# Patient Record
Sex: MALE | Race: WHITE | Employment: OTHER | URBAN - METROPOLITAN AREA
[De-identification: names, ages, dates, MRNs, and addresses within clinical notes are randomized per-mention and may not be internally consistent; named-entity substitution may affect disease eponyms.]

---

## 2023-01-11 ENCOUNTER — HOSPITAL ENCOUNTER (EMERGENCY)
Age: 78
Discharge: HOME | End: 2023-01-11
Payer: MEDICARE

## 2023-01-11 VITALS
SYSTOLIC BLOOD PRESSURE: 148 MMHG | HEART RATE: 89 BPM | TEMPERATURE: 98.6 F | BODY MASS INDEX: 32.82 KG/M2 | WEIGHT: 222.22 LBS | OXYGEN SATURATION: 100 % | RESPIRATION RATE: 17 BRPM | DIASTOLIC BLOOD PRESSURE: 84 MMHG

## 2023-01-11 DIAGNOSIS — R04.0 EPISTAXIS: Primary | ICD-10-CM

## 2023-01-11 PROCEDURE — 99283 EMERGENCY DEPT VISIT LOW MDM: CPT

## 2023-01-11 PROCEDURE — 6370000000 HC RX 637 (ALT 250 FOR IP): Performed by: PHYSICIAN ASSISTANT

## 2023-01-11 PROCEDURE — 30901 CONTROL OF NOSEBLEED: CPT

## 2023-01-11 RX ORDER — OXYMETAZOLINE HYDROCHLORIDE 0.05 G/100ML
2 SPRAY NASAL ONCE
Status: COMPLETED | OUTPATIENT
Start: 2023-01-11 | End: 2023-01-11

## 2023-01-11 RX ORDER — CEPHALEXIN 500 MG/1
500 CAPSULE ORAL 2 TIMES DAILY
Qty: 14 CAPSULE | Refills: 0 | Status: SHIPPED | OUTPATIENT
Start: 2023-01-11 | End: 2023-01-18

## 2023-01-11 RX ADMIN — SILVER NITRATE APPLICATORS 1 EACH: 25; 75 STICK TOPICAL at 19:50

## 2023-01-11 RX ADMIN — OXYMETAZOLINE HCL 2 SPRAY: 0.05 SPRAY NASAL at 19:51

## 2023-01-11 ASSESSMENT — ENCOUNTER SYMPTOMS
SHORTNESS OF BREATH: 0
VOMITING: 0
COLOR CHANGE: 0
CHOKING: 0

## 2023-01-12 NOTE — ED PROVIDER NOTES
[address] [facility]        Pt Name: Dean Draper  MRN: [id number]  [place] 1945  Date of evaluation: 1/11/2023  Provider: LIN Brock  PCP: Simran Rose MD  Note Started: 8:01 PM EST 1/11/23      MACEY. I have evaluated this patient. My supervising physician was available for consultation. CHIEF COMPLAINT       Chief Complaint   Patient presents with    Epistaxis     X30 min; takes eliquis       HISTORY OF PRESENT ILLNESS: 1 or more Elements     History from : Patient    Limitations to history : None    Dean Draper is a 65 y.o. male who presents with a right-sided nosebleed. About 30 minutes ago he blew his nose and started bleeding profusely from his nares. He does take Eliquis with a history of A. fib. He has had a total of 3 nosebleeds in his life but denies prior history of requiring treatment at the emergency department for nosebleeds or cauterization. He denies head or facial injury. He does have a history of hypertension on lisinopril. No shortness of breath. Nursing Notes were all reviewed and agreed with or any disagreements were addressed in the HPI. REVIEW OF SYSTEMS :      Review of Systems   Constitutional:  Negative for fever. HENT:  Positive for nosebleeds. Respiratory:  Negative for choking and shortness of breath. Gastrointestinal:  Negative for vomiting. Skin:  Negative for color change and wound. Psychiatric/Behavioral:  Negative for agitation, behavioral problems and confusion. Positives and Pertinent negatives as per HPI. SURGICAL HISTORY   History reviewed. No pertinent surgical history. [address]       Discharge Medication List as of 1/11/2023  8:04 PM          ALLERGIES     Patient has no known allergies. FAMILYHISTORY     History reviewed. No pertinent family history.      SOCIAL HISTORY       Social History     Tobacco Use    Smoking status: Never Smokeless tobacco: Never   Substance Use Topics    Alcohol use: No    Drug use: No       SCREENINGS        Alejandra Coma Scale  Eye Opening: Spontaneous  Best Verbal Response: Oriented  Best Motor Response: Obeys commands  Alejandra Coma Scale Score: 15                CIWA Assessment  BP: (!) 148/84  Heart Rate: 89           PHYSICAL EXAM  1 or more Elements     ED Triage Vitals [01/11/23 1915]   BP Temp Temp Source Heart Rate Resp SpO2 Height Weight   (!) 153/86 -- Oral (!) 102 18 99 % -- 222 lb 3.6 oz (100.8 kg)       Physical Exam  Vitals and nursing note reviewed. Constitutional:       General: He is not in acute distress. HENT:      Nose:      Right Nostril: Epistaxis present. No septal hematoma. Mouth/Throat:      Mouth: Mucous membranes are moist.      Pharynx: Oropharynx is clear. Eyes:      Pupils: Pupils are equal, round, and reactive to light. Cardiovascular:      Rate and Rhythm: Tachycardia present. Pulmonary:      Effort: Pulmonary effort is normal. No respiratory distress. Musculoskeletal:         General: Normal range of motion. Cervical back: Normal range of motion. Neurological:      General: No focal deficit present. Mental Status: He is alert and oriented to person, place, and time. Psychiatric:         Mood and Affect: Mood normal.         Behavior: Behavior normal.       DIAGNOSTIC RESULTS   LABS:    Labs Reviewed - No data to display    When ordered only abnormal lab results are displayed. All other labs were within normal range or not returned as of this dictation. EKG: When ordered, EKG's are interpreted by the Emergency Department Physician in the absence of a cardiologist.  Please see their note for interpretation of EKG.     RADIOLOGY:   Non-plain film images such as CT, Ultrasound and MRI are read by the radiologist. Plain radiographic images are visualized and preliminarily interpreted by the ED Provider with the below findings:    Interpretation per the Radiologist below, if available at the time of this note:    No orders to display     No results found.    No results found.    PROCEDURES   Unless otherwise noted below, none     Epistaxis Mgmt    Date/Time: 1/11/2023 9:44 PM  Performed by: LIN Lundy  Authorized by: LIN Lundy     Consent:     Consent obtained:  Verbal    Consent given by:  Patient    Risks, benefits, and alternatives were discussed: yes      Risks discussed:  Infection, pain and bleeding  Universal protocol:     Patient identity confirmed:  Verbally with patient  Procedure details:     Treatment site:  R anterior    Treatment method:  Anterior pack and silver nitrate    Treatment complexity:  Extensive    Treatment episode: initial    Post-procedure details:     Assessment:  Bleeding stopped    Procedure completion:  Tolerated    CRITICAL CARE TIME (.cctime)   I performed a total Critical Care time of 0 minutes, excluding separately reportable procedures.  There was a high probability of clinically significant/life threatening deterioration in the patient's condition which required my urgent intervention. Not limited to multiple reexaminations, discussions with attending physician and consultants.    PAST MEDICAL HISTORY      has a past medical history of A-fib ([facility]) (05/2018) and Diabetes mellitus ([facility]).     EMERGENCY DEPARTMENT COURSE and DIFFERENTIAL DIAGNOSIS/MDM:   Vitals:    Vitals:    01/11/23 1915 01/11/23 2030   BP: (!) 153/86 (!) 148/84   Pulse: (!) 102 89   Resp: 18 17   Temp:  98.6 °F (37 °C)   TempSrc: Oral Oral   SpO2: 99% 100%   Weight: 222 lb 3.6 oz (100.8 kg)        Patient was given the following medications:  Medications   silver nitrate applicators applicator 1 each (1 each Topical Given 1/11/23 1950)   oxymetazoline (AFRIN) 0.05 % nasal spray 2 spray (2 sprays Each Nostril Given by Other 1/11/23 1951)             Is this patient to be included in the SEP-1 Core Measure due to severe sepsis or septic shock?   No  Exclusion criteria - the patient is NOT to be included for SEP-1 Core Measure due to: Infection is not suspected    CONSULTS: (Who and What was discussed)  None  Discussion with Other Profesionals : None    Social Determinants : None    Records Reviewed : None    CC/HPI Summary, DDx, ED Course, and Reassessment: Patient afebrile initially tachycardic on recheck pulses normal.  Blood pressure around 378V systolic not hypoxic. He has brisk anterior nosebleed from the right nares. He is on Eliquis. Despite Afrin, compression, several attempts at cauterization was unable to get the brisk bleeding to stop so I placed a Rhino Rocket started the patient on Keflex and referred him to ENT. He is advised to have the rocket removed in the next couple of days return for new or worsening. He ambulated to the bathroom and around the room without rebleeding. DDX:Coagulopathy, Platelet Dysfunction, Thrombocytopenia, Anemia, Hpertension, Nasal Infection, Aspiration Pneumonia. I am the Primary Clinician of Record.     FINAL IMPRESSION      1. Epistaxis          DISPOSITION/PLAN     DISPOSITION Decision To Discharge 01/11/2023 07:58:17 PM      PATIENT REFERRED TO:  [address]  [address] [place] [address]  [phone]    Call in 1 day  For follow up with ENT      DISCHARGE MEDICATIONS:  Discharge Medication List as of 1/11/2023  8:04 PM        START taking these medications    Details   cephALEXin (KEFLEX) 500 MG capsule Take 1 capsule by mouth 2 times daily for 7 days, Disp-14 capsule, R-0Print             DISCONTINUED MEDICATIONS:  Discharge Medication List as of 1/11/2023  8:04 PM                 (Please note that portions of this note were completed with a voice recognition program.  Efforts were made to edit the dictations but occasionally words are mis-transcribed.)    Karmen Olmos (electronically signed)            LIN Olmos  01/11/23 4704

## 2023-01-16 ENCOUNTER — OFFICE VISIT (OUTPATIENT)
Dept: ENT CLINIC | Age: 78
End: 2023-01-16
Payer: MEDICARE

## 2023-01-16 VITALS
WEIGHT: 220 LBS | HEART RATE: 101 BPM | RESPIRATION RATE: 16 BRPM | SYSTOLIC BLOOD PRESSURE: 131 MMHG | OXYGEN SATURATION: 96 % | DIASTOLIC BLOOD PRESSURE: 85 MMHG | HEIGHT: 69 IN | BODY MASS INDEX: 32.58 KG/M2

## 2023-01-16 DIAGNOSIS — R04.0 EPISTAXIS: Primary | ICD-10-CM

## 2023-01-16 DIAGNOSIS — Z92.29 HX OF LONG TERM USE OF BLOOD THINNERS: ICD-10-CM

## 2023-01-16 PROCEDURE — 1123F ACP DISCUSS/DSCN MKR DOCD: CPT | Performed by: OTOLARYNGOLOGY

## 2023-01-16 PROCEDURE — G8427 DOCREV CUR MEDS BY ELIG CLIN: HCPCS | Performed by: OTOLARYNGOLOGY

## 2023-01-16 PROCEDURE — 99203 OFFICE O/P NEW LOW 30 MIN: CPT | Performed by: OTOLARYNGOLOGY

## 2023-01-16 PROCEDURE — 31238 NSL/SINS NDSC SRG NSL HEMRRG: CPT | Performed by: OTOLARYNGOLOGY

## 2023-01-16 PROCEDURE — G8484 FLU IMMUNIZE NO ADMIN: HCPCS | Performed by: OTOLARYNGOLOGY

## 2023-01-16 PROCEDURE — 1036F TOBACCO NON-USER: CPT | Performed by: OTOLARYNGOLOGY

## 2023-01-16 PROCEDURE — G8417 CALC BMI ABV UP PARAM F/U: HCPCS | Performed by: OTOLARYNGOLOGY

## 2023-01-16 RX ORDER — DOXYCYCLINE HYCLATE 100 MG/1
CAPSULE ORAL
COMMUNITY

## 2023-01-16 RX ORDER — LISINOPRIL AND HYDROCHLOROTHIAZIDE 25; 20 MG/1; MG/1
TABLET ORAL
COMMUNITY
Start: 2022-08-25

## 2023-01-16 RX ORDER — ERYTHROMYCIN 5 MG/G
OINTMENT OPHTHALMIC
COMMUNITY

## 2023-01-16 RX ORDER — ATORVASTATIN CALCIUM 40 MG/1
TABLET, FILM COATED ORAL
COMMUNITY
Start: 2022-08-29

## 2023-01-16 RX ORDER — BIOTIN 10 MG
1 TABLET ORAL DAILY
COMMUNITY

## 2023-01-16 RX ORDER — METOPROLOL SUCCINATE 50 MG/1
TABLET, EXTENDED RELEASE ORAL
COMMUNITY
Start: 2022-07-08

## 2023-01-16 NOTE — PROGRESS NOTES
[facility]      Patient Name: [address] [place] [address] Record Number:  [id number]  Primary Care Physician:  Loki Chávez MD  Date of Consultation: 1/16/2023    Chief Complaint: Epistaxis        HISTORY OF PRESENT ILLNESS  Chadd Torrez is a(n) 65 y.o. male who presents for evaluation of nosebleed. The patient said he had a very bad nosebleed on 11 January. He ended up having a Rhino Rocket placed on the right side. He says he does not really have any significant history of bad nosebleeds. He is on Eliquis for A. fib, but did get permission from his cardiologist to hold it. He has not taken it since the nosebleed. He denies any nasal trauma. Denies nasal surgery. Has been on an antibiotic. REVIEW OF SYSTEMS  As above    PHYSICAL EXAM  GENERAL: No Acute Distress, Alert and Oriented, no Hoarseness, strong voice  EYES: EOMI, Anti-icteric  HENT:   Head: Normocephalic and atraumatic. Face:  Symmetric, facial nerve intact, no sinus tenderness  Right Ear: Normal external ear  Left Ear: Normal external ear,  Mouth/Oral Cavity:  normal lips, Uvula is midline, no mucosal lesions, no trismus,   Oropharynx/Larynx:  normal oropharynx  Nose:Normal external nasal appearance. See below  NECK: Normal range of motion, no thyromegaly, trachea is midline, no lymphadenopathy, no neck masses, no crepitus      PROCEDURE  Nasal endoscopy with control of epistaxis  The right-sided Rhino Rocket was removed. Afrin and lidocaine were applied the bilateral nasal cavity. A rigid scope was used to visualize the left nasal cavity. It appeared to be normal.  The rigid scope was then used to visualize the right nasal cavity. There was some blood that I removed with a Salcedo suction. He had some oozing from the right inferior turbinate as well as the left septum. It was probably bleeding most significantly on the inferior septum.   Using the endoscope and silver nitrate I cauterized these areas. I then covered it with Surgicel. He tolerated this well        ASSESSMENT/PLAN  1. Epistaxis  The Rhino Rocket was removed today. It usually difficult to tell the exact location of the original bleeding, but I suspect it was the septum about 1 to 2 cm from anterior nasal cavity. I cauterize several areas in the right nasal cavity today. I would like for him to use nasal saline several times a day. I told him to take it relatively easy over the next week. I think as long as he has no bleeding he can restart his Eliquis tomorrow. I would like to see him in 3 weeks. 2. Hx of long term use of blood thinners  As above             I have performed a head and neck physical exam personally or was physically present during the key or critical portions of the service. This note was generated completely or in part utilizing Dragon dictation speech recognition software. Occasionally, words are mistranscribed and despite editing, the text may contain inaccuracies due to incorrect word recognition. If further clarification is needed please contact the office at [phone].

## 2023-02-02 ENCOUNTER — OFFICE VISIT (OUTPATIENT)
Dept: ENT CLINIC | Age: 78
End: 2023-02-02
Payer: MEDICARE

## 2023-02-02 VITALS
WEIGHT: 217 LBS | HEIGHT: 69 IN | OXYGEN SATURATION: 97 % | SYSTOLIC BLOOD PRESSURE: 144 MMHG | BODY MASS INDEX: 32.14 KG/M2 | HEART RATE: 87 BPM | DIASTOLIC BLOOD PRESSURE: 85 MMHG

## 2023-02-02 DIAGNOSIS — Z92.29 HX OF LONG TERM USE OF BLOOD THINNERS: ICD-10-CM

## 2023-02-02 DIAGNOSIS — R04.0 EPISTAXIS: Primary | ICD-10-CM

## 2023-02-02 PROCEDURE — G8484 FLU IMMUNIZE NO ADMIN: HCPCS | Performed by: OTOLARYNGOLOGY

## 2023-02-02 PROCEDURE — 1036F TOBACCO NON-USER: CPT | Performed by: OTOLARYNGOLOGY

## 2023-02-02 PROCEDURE — 1123F ACP DISCUSS/DSCN MKR DOCD: CPT | Performed by: OTOLARYNGOLOGY

## 2023-02-02 PROCEDURE — G8417 CALC BMI ABV UP PARAM F/U: HCPCS | Performed by: OTOLARYNGOLOGY

## 2023-02-02 PROCEDURE — 99213 OFFICE O/P EST LOW 20 MIN: CPT | Performed by: OTOLARYNGOLOGY

## 2023-02-02 PROCEDURE — G8427 DOCREV CUR MEDS BY ELIG CLIN: HCPCS | Performed by: OTOLARYNGOLOGY

## 2023-02-02 NOTE — PROGRESS NOTES
[address] [facility]  Follow up      Patient Name: [address] [place] [address] Record Number:  [id number]  Primary Care Physician:  Jam Loya MD  Date of Consultation: 2/2/2023    Chief Complaint: Nosebleed        Interval History  Patient is following up for his nosebleed. On January 16, 2023 I remove the [employer] and cauterized some areas in the right nasal cavity. He has been using nasal saline. He has not had any nosebleeds since that time. REVIEW OF SYSTEMS  As above    PHYSICAL EXAM  GENERAL: No Acute Distress, Alert and Oriented, no Hoarseness, strong voice  EYES: EOMI, Anti-icteric  HENT:   Head: Normocephalic and atraumatic. Face:  Symmetric, facial nerve intact, no sinus tenderness  Right Ear: Normal external ear  Left Ear: Normal external ear  Mouth/Oral Cavity:  normal lips, Uvula is midline, no mucosal lesions  Oropharynx/Larynx:  normal oropharynx,  Nose:Normal external nasal appearance. Anterior rhinoscopy shows some crusting in the nasal cavity on the right side without evidence of recent bleeding  NECK: Normal range of motion, no thyromegaly, trachea is midline, no lymphadenopathy, no neck masses, no crepitus          ASSESSMENT/PLAN  1.  Epistaxis- he has not had any bleeding since I saw him. Continue the nasal saline at least until the weather warms up. I told him that if he has any significant bleeding to call the office and I will get him back in.    2. Hx of long term use of blood thinners  Continue blood thinners as prescribed. I have performed a head and neck physical exam personally or was physically present during the key or critical portions of the service. This note was generated completely or in part utilizing Dragon dictation speech recognition software. Occasionally, words are mistranscribed and despite editing, the text may contain inaccuracies due to incorrect word recognition.   If further clarification is needed please contact the office at [phone].

## 2023-03-04 ENCOUNTER — HOSPITAL ENCOUNTER (EMERGENCY)
Age: 78
Discharge: HOME | End: 2023-03-04
Attending: EMERGENCY MEDICINE
Payer: MEDICARE

## 2023-03-04 VITALS
DIASTOLIC BLOOD PRESSURE: 91 MMHG | HEIGHT: 69 IN | OXYGEN SATURATION: 98 % | TEMPERATURE: 97.7 F | RESPIRATION RATE: 18 BRPM | WEIGHT: 216.27 LBS | SYSTOLIC BLOOD PRESSURE: 173 MMHG | BODY MASS INDEX: 32.03 KG/M2 | HEART RATE: 99 BPM

## 2023-03-04 DIAGNOSIS — R04.0 EPISTAXIS: Primary | ICD-10-CM

## 2023-03-04 LAB
A/G RATIO: 1.4 (ref 1.1–2.2)
ABO/RH: NORMAL
ALBUMIN SERPL-MCNC: 4.4 G/DL (ref 3.4–5)
ALP BLD-CCNC: 84 U/L (ref 40–129)
ALT SERPL-CCNC: 12 U/L (ref 10–40)
ANION GAP SERPL CALCULATED.3IONS-SCNC: 15 MMOL/L (ref 3–16)
ANTIBODY SCREEN: NORMAL
APTT: 30.3 SEC (ref 23–34.3)
AST SERPL-CCNC: 14 U/L (ref 15–37)
BASOPHILS ABSOLUTE: 0 K/UL (ref 0–0.2)
BASOPHILS RELATIVE PERCENT: 1.5 %
BILIRUB SERPL-MCNC: 0.7 MG/DL (ref 0–1)
BUN BLDV-MCNC: 16 MG/DL (ref 7–20)
CALCIUM SERPL-MCNC: 9.2 MG/DL (ref 8.3–10.6)
CHLORIDE BLD-SCNC: 102 MMOL/L (ref 99–110)
CO2: 20 MMOL/L (ref 21–32)
CREAT SERPL-MCNC: 0.7 MG/DL (ref 0.8–1.3)
EOSINOPHILS ABSOLUTE: 0.1 K/UL (ref 0–0.6)
EOSINOPHILS RELATIVE PERCENT: 2.4 %
GFR SERPL CREATININE-BSD FRML MDRD: >60 ML/MIN/{1.73_M2}
GLUCOSE BLD-MCNC: 193 MG/DL (ref 70–99)
HCT VFR BLD CALC: 38.8 % (ref 40.5–52.5)
HEMOGLOBIN: 13 G/DL (ref 13.5–17.5)
INR BLD: 1.23 (ref 0.87–1.14)
LYMPHOCYTES ABSOLUTE: 1.4 K/UL (ref 1–5.1)
LYMPHOCYTES RELATIVE PERCENT: 41.5 %
MCH RBC QN AUTO: 32.8 PG (ref 26–34)
MCHC RBC AUTO-ENTMCNC: 33.5 G/DL (ref 31–36)
MCV RBC AUTO: 97.9 FL (ref 80–100)
MONOCYTES ABSOLUTE: 0.6 K/UL (ref 0–1.3)
MONOCYTES RELATIVE PERCENT: 17 %
NEUTROPHILS ABSOLUTE: 1.2 K/UL (ref 1.7–7.7)
NEUTROPHILS RELATIVE PERCENT: 37.6 %
PDW BLD-RTO: 16.1 % (ref 12.4–15.4)
PLATELET # BLD: 91 K/UL (ref 135–450)
PLATELET SLIDE REVIEW: ABNORMAL
PMV BLD AUTO: 8.6 FL (ref 5–10.5)
POTASSIUM REFLEX MAGNESIUM: 3.8 MMOL/L (ref 3.5–5.1)
PROTHROMBIN TIME: 15.4 SEC (ref 11.7–14.5)
RBC # BLD: 3.97 M/UL (ref 4.2–5.9)
SLIDE REVIEW: ABNORMAL
SODIUM BLD-SCNC: 137 MMOL/L (ref 136–145)
TOTAL PROTEIN: 7.6 G/DL (ref 6.4–8.2)
WBC # BLD: 3.3 K/UL (ref 4–11)

## 2023-03-04 PROCEDURE — 85025 COMPLETE CBC W/AUTO DIFF WBC: CPT

## 2023-03-04 PROCEDURE — 86850 RBC ANTIBODY SCREEN: CPT

## 2023-03-04 PROCEDURE — 85730 THROMBOPLASTIN TIME PARTIAL: CPT

## 2023-03-04 PROCEDURE — 99283 EMERGENCY DEPT VISIT LOW MDM: CPT

## 2023-03-04 PROCEDURE — 85610 PROTHROMBIN TIME: CPT

## 2023-03-04 PROCEDURE — 80053 COMPREHEN METABOLIC PANEL: CPT

## 2023-03-04 PROCEDURE — 86900 BLOOD TYPING SEROLOGIC ABO: CPT

## 2023-03-04 PROCEDURE — 30903 CONTROL OF NOSEBLEED: CPT

## 2023-03-04 PROCEDURE — 86901 BLOOD TYPING SEROLOGIC RH(D): CPT

## 2023-03-04 PROCEDURE — 6370000000 HC RX 637 (ALT 250 FOR IP): Performed by: EMERGENCY MEDICINE

## 2023-03-04 RX ORDER — OXYMETAZOLINE HYDROCHLORIDE 0.05 G/100ML
2 SPRAY NASAL ONCE
Status: COMPLETED | OUTPATIENT
Start: 2023-03-04 | End: 2023-03-04

## 2023-03-04 RX ORDER — AMOXICILLIN 500 MG/1
500 CAPSULE ORAL 3 TIMES DAILY
Qty: 30 CAPSULE | Refills: 0 | Status: SHIPPED | OUTPATIENT
Start: 2023-03-04 | End: 2023-03-14

## 2023-03-04 RX ORDER — AMOXICILLIN 250 MG/1
500 CAPSULE ORAL ONCE
Status: COMPLETED | OUTPATIENT
Start: 2023-03-04 | End: 2023-03-04

## 2023-03-04 RX ORDER — LIDOCAINE HYDROCHLORIDE 20 MG/ML
JELLY TOPICAL PRN
Status: DISCONTINUED | OUTPATIENT
Start: 2023-03-04 | End: 2023-03-04 | Stop reason: HOSPADM

## 2023-03-04 RX ADMIN — OXYMETAZOLINE HCL 2 SPRAY: 0.05 SPRAY NASAL at 04:37

## 2023-03-04 RX ADMIN — AMOXICILLIN 500 MG: 250 CAPSULE ORAL at 06:48

## 2023-03-04 RX ADMIN — LIDOCAINE HYDROCHLORIDE: 20 JELLY TOPICAL at 04:38

## 2023-03-04 ASSESSMENT — LIFESTYLE VARIABLES
HOW OFTEN DO YOU HAVE A DRINK CONTAINING ALCOHOL: NEVER
HOW MANY STANDARD DRINKS CONTAINING ALCOHOL DO YOU HAVE ON A TYPICAL DAY: PATIENT DOES NOT DRINK

## 2023-03-04 NOTE — ED PROVIDER NOTES
[address] [facility]      Pt Name: Douglas Navarro  MRN: [id number]  [place] 1945  Date of evaluation: 3/4/2023  Provider: Bravo Chen DO    CHIEF COMPLAINT  Chief Complaint   Patient presents with    Epistaxis         This patient is at risk for a communicable infection. Therefore, personal protection equipment consisting of a mask was worn for the exam.    HPI  Douglas Navarro is a 65 y.o. male who presents with bleeding started a few minutes prior to arrival.  He states he was sitting watching TV and suddenly started felt dripping. He notes his nose was bleeding. He states he bled profusely for minutes and decided come to the emergency department. He has a history of multiple nosebleeds. He sees Dr Peña Peña. His doctor told him to come to the office whenever he had a problem like this. However, the office is closed. He is on Eliquis for atrial fibrillation. He denies any injuries. Denies picking his nose or bumping his nose. He denies any fevers or chills. Denies any recent URIs. He denies any new medications. ? REVIEW OF SYSTEMS  All systems negative except as noted in the HPI. Reviewed Nurses' notes and concur. No LMP for male patient. PAST MEDICAL HISTORY  Past Medical History:   Diagnosis Date    A-fib ([facility] 05/2018    Diabetes mellitus ([facility]        FAMILY HISTORY  No family history on file. SOCIAL HISTORY   reports that he has never smoked. He has never used smokeless tobacco. He reports that he does not drink alcohol and does not use drugs. SURGICAL HISTORY  No past surgical history on file.     CURRENT MEDICATIONS  Current Outpatient Rx   Medication Sig Dispense Refill    amoxicillin (AMOXIL) 500 MG capsule Take 1 capsule by mouth 3 times daily for 10 days 30 capsule 0    apixaban (ELIQUIS) 5 MG TABS tablet Eliquis 5 mg tablet      atorvastatin (LIPITOR) 40 MG tablet atorvastatin 40 mg tablet lisinopril-hydroCHLOROthiazide (PRINZIDE;ZESTORETIC) 20-25 MG per tablet lisinopril 20 mg-hydrochlorothiazide 25 mg tablet      metFORMIN (GLUCOPHAGE) 500 MG tablet       metoprolol succinate (TOPROL XL) 50 MG extended release tablet metoprolol succinate ER 50 mg tablet,extended release 24 hr      Multiple Vitamins-Minerals (THERAPEUTIC-M) TABS Take 1 tablet by mouth daily      erythromycin (ROMYCIN) 5 MG/GM ophthalmic ointment erythromycin 5 mg/gram (0.5 %) eye ointment      doxycycline hyclate (VIBRAMYCIN) 100 MG capsule doxycycline hyclate 100 mg capsule         ALLERGIES  No Known Allergies    Tetanus vaccination status reviewed: tetanus re-vaccination not indicated. PHYSICAL EXAM  VITAL SIGNS: BP (!) 173/91   Pulse 99   Temp 97.7 °F (36.5 °C) (Oral)   Resp 18   Ht 5' 9\" (1.753 m)   Wt 216 lb 4.3 oz (98.1 kg)   SpO2 98%   BMI 31.94 kg/m²   Constitutional: Well-developed, well-nourished, appears normal, nontoxic, activity: Resting comfortably on the cart, in no obvious pain, speaking full sentences, does not appear ill or toxic. There is blood coming from his right nare. Nose clip had not been placed yet. HENT: Normocephalic, Atraumatic, Bilateral external ears normal, Oropharynx normal and moist, blood is noted in the posterior oropharynx. There are no pharyngeal exudates, no pharyngeal erythema, Dentition intact, Nose: normal mucosal edema, no nasal erythema, no nasal discharge. Patient is bleeding quite a bit from his right nare. There is some oozing from his left nare  Eyes: PERRLA, Conjunctiva normal, No discharge. Neck: Normal range of motion, no tenderness, Supple, no adenopathy. Cardiovascular: Normal heart rate, Normal rhythm, no murmurs, no gallops, no rubs. Thorax & Lungs: normal breath sounds, no respiratory distress, no wheezing, no rales, no rhonchi   Skin: Warm, Dry, No erythema, no abnormal bruising is noted, no rash.   Extremities: No edema, no tenderness, No cyanosis, capillary refill less than 2 seconds. Neurologic: Alert & oriented x 3    COURSE & MEDICAL DECISION MAKING  Pertinent Labs reviewed. (See chart for details)    LABORATORY  Labs Reviewed   CBC WITH AUTO DIFFERENTIAL - Abnormal; Notable for the following components:       Result Value    WBC 3.3 (*)     RBC 3.97 (*)     Hemoglobin 13.0 (*)     Hematocrit 38.8 (*)     RDW 16.1 (*)     Platelets 91 (*)     Neutrophils Absolute 1.2 (*)     All other components within normal limits   PROTIME-INR - Abnormal; Notable for the following components:    Protime 15.4 (*)     INR 1.23 (*)     All other components within normal limits   COMPREHENSIVE METABOLIC PANEL W/ REFLEX TO MG FOR LOW K - Abnormal; Notable for the following components:    CO2 20 (*)     Glucose 193 (*)     Creatinine 0.7 (*)     AST 14 (*)     All other components within normal limits   APTT   TYPE AND SCREEN       PROCEDURES    Nasal Packing procedure by ED physician  The nasal passage was cleared with (blowing/suction, etc)  prior to insertion of the medications with removal of a medium size clot. Afrin was instilled into the right nare for 5 minutes. Lidocaine jelly 2%  was then inserted into the right nare. A Rhino Rocket nasal packing was placed in the right nare without difficulty. 10 cc of air was instilled into the Rhino Rocket in small increments to obtain hemostasis. Patient was observed while we were waiting for lab work to be returned. His bleeding was controlled.     Vitals:    03/04/23 0400 03/04/23 0645   BP: (!) 161/98 (!) 173/91   Pulse: 99 99   Resp: 20 18   Temp: 97.6 °F (36.4 °C) 97.7 °F (36.5 °C)   TempSrc: Temporal Oral   SpO2: 100% 98%   Weight: 216 lb 4.3 oz (98.1 kg)    Height: 5' 9\" (1.753 m)        Medications   lidocaine (XYLOCAINE) 2 % uro-jet ( Topical Given 3/4/23 0438)   oxymetazoline (AFRIN) 0.05 % nasal spray 2 spray (2 sprays Each Nostril Given 3/4/23 0437)   amoxicillin (AMOXIL) capsule 500 mg (500 mg Oral Given 3/4/23 0648) New Prescriptions    AMOXICILLIN (AMOXIL) 500 MG CAPSULE    Take 1 capsule by mouth 3 times daily for 10 days       SEP-1 CORE MEASURE DATA  Exclusion criteria: the patient is NOT to be included for sepsis due to:  SIRS criteria are not met    Patient remained stable in the ED. his nose was packed with a Rhino Rocket on the right side. Bleeding was controlled. He was instructed to follow-up with Dr. Noe Mancini in 2 days for packing removal.  He was instructed to come here if he cannot get into see Dr. Vern Hinojosa. He was instructed return if any problems. His platelets were low at 91,000 and he was informed of this. This is not dangerously low or life-threatening. I feel he can follow-up with his family doctor for further evaluation and treatment. Diagnostic considerations include but are not limited to:  Coagulopathy, Platelet Dysfunction, Thrombocytopenia, Anemia, Hypertension, Nasal Infection, Aspiration Pneumonia, other. CBC-CBC was ordered to rule out anemia, infection, abnormal platelet count, polycythemia, abnormal Red cell pathology, or any other pathology that might be causing the patient's symptoms    CMP-CMP was ordered to rule out electrolyte abnormalities, liver dysfunction, kidney dysfunction, electrolyte imbalance, abnormal transaminases, or any other pathology that might be causing the patient's symptoms. The patient's blood pressure was found to be elevated according to CMS/Medicare and the Affordable Care Act/ObamaCare criteria. Elevated blood pressure could occur because of pain or anxiety or other reasons and does not mean that they need to have their blood pressure treated or medications otherwise adjusted. However, this could also be a sign that they will need to have their blood pressure treated or medications changed. The patient was instructed to follow up closely with their personal physician to have their blood pressure rechecked.  The patient was instructed to take a list of recent blood pressure readings to their next visit with their personal physician. See discharge instructions for specific medications, discharge information, and treatments. They were verbally instructed to return to emergency if any problems. (This chart has been completed using [address]. Although attempts have been made to ensure accuracy, words and/or phrases may not be transcribed as intended.)    Patient refused pain medicines at the time of their exam.    IMPRESSION(S):  1. Epistaxis        ?   Recheck Times: Multiple every 15 to 30 minutes including [address] [facility],   03/04/23 1607

## 2023-03-06 ENCOUNTER — OFFICE VISIT (OUTPATIENT)
Dept: ENT CLINIC | Age: 78
End: 2023-03-06
Payer: MEDICARE

## 2023-03-06 VITALS
SYSTOLIC BLOOD PRESSURE: 139 MMHG | HEIGHT: 69 IN | BODY MASS INDEX: 31.4 KG/M2 | DIASTOLIC BLOOD PRESSURE: 80 MMHG | HEART RATE: 85 BPM | WEIGHT: 212 LBS

## 2023-03-06 DIAGNOSIS — R04.0 EPISTAXIS: Primary | ICD-10-CM

## 2023-03-06 PROCEDURE — 31238 NSL/SINS NDSC SRG NSL HEMRRG: CPT | Performed by: OTOLARYNGOLOGY

## 2023-03-06 NOTE — PROGRESS NOTES
Patient is following up for his nosebleeds. I saw him in January 2023 and remove the [employer] from the right side. He did well until Friday night when he developed a right-sided nosebleed. He was doing nothing but watching television. He went to the emergency room and had a Rhino Rocket placed. He has been on antibiotics and stopped his Eliquis since that time. Procedure  Nasal endoscopy with control of epistaxis  The right-sided Rhino Rocket was removed  Afrin and lidocaine were applied the bilateral nasal cavity  A rigid scope was used to visualize the bilateral nasal cavity. On the left side no evidence of bleeding. On the right side there was some slight oozing of the septum and adjacent inferior turbinate. Using the endoscope I cauterized this with silver nitrate. I then covered it with Surgicel. Plan  This is the second time the patient has had a Rhino Rocket placed this was here. I told him that there is a few different options. 1 option would be to take him to the operating room and do a sphenopalatine artery ligation on the right side. We discussed this is like sinus surgery. We discussed the risk and benefits of this. I told him he could also discuss with his cardiologist if there is any chance he would be a candidate for something like a Watchman procedure. If he can get off of the Eliquis I do think that his nosebleeds will significantly improve to the point where he will not require something like a Rhino Rocket if he has 1.     The patient says that he is going to talk with his cardiologist first.  He will get back to me if he decides he would want to do something like a sphenopalatine artery ligation